# Patient Record
Sex: MALE | Race: WHITE | NOT HISPANIC OR LATINO | URBAN - METROPOLITAN AREA
[De-identification: names, ages, dates, MRNs, and addresses within clinical notes are randomized per-mention and may not be internally consistent; named-entity substitution may affect disease eponyms.]

---

## 2023-04-27 LAB
ALANINE AMINOTRANSFERASE (SGPT) (U/L) IN SER/PLAS: 18 U/L (ref 10–52)
ALBUMIN (G/DL) IN SER/PLAS: 4.8 G/DL (ref 3.4–5)
ALKALINE PHOSPHATASE (U/L) IN SER/PLAS: 67 U/L (ref 33–120)
ANION GAP IN SER/PLAS: 13 MMOL/L (ref 10–20)
ASPARTATE AMINOTRANSFERASE (SGOT) (U/L) IN SER/PLAS: 21 U/L (ref 9–39)
BASOPHILS (10*3/UL) IN BLOOD BY AUTOMATED COUNT: 0.04 X10E9/L (ref 0–0.1)
BASOPHILS/100 LEUKOCYTES IN BLOOD BY AUTOMATED COUNT: 0.8 % (ref 0–2)
BILIRUBIN TOTAL (MG/DL) IN SER/PLAS: 0.9 MG/DL (ref 0–1.2)
CALCIDIOL (25 OH VITAMIN D3) (NG/ML) IN SER/PLAS: 15 NG/ML
CALCIUM (MG/DL) IN SER/PLAS: 9.8 MG/DL (ref 8.6–10.3)
CARBON DIOXIDE, TOTAL (MMOL/L) IN SER/PLAS: 29 MMOL/L (ref 21–32)
CHLORIDE (MMOL/L) IN SER/PLAS: 102 MMOL/L (ref 98–107)
CHOLESTEROL (MG/DL) IN SER/PLAS: 233 MG/DL (ref 0–199)
CHOLESTEROL IN HDL (MG/DL) IN SER/PLAS: 38 MG/DL
CHOLESTEROL/HDL RATIO: 6.1
COBALAMIN (VITAMIN B12) (PG/ML) IN SER/PLAS: 248 PG/ML (ref 211–911)
CREATININE (MG/DL) IN SER/PLAS: 0.99 MG/DL (ref 0.5–1.3)
EOSINOPHILS (10*3/UL) IN BLOOD BY AUTOMATED COUNT: 0.11 X10E9/L (ref 0–0.7)
EOSINOPHILS/100 LEUKOCYTES IN BLOOD BY AUTOMATED COUNT: 2.3 % (ref 0–6)
ERYTHROCYTE DISTRIBUTION WIDTH (RATIO) BY AUTOMATED COUNT: 12.3 % (ref 11.5–14.5)
ERYTHROCYTE MEAN CORPUSCULAR HEMOGLOBIN CONCENTRATION (G/DL) BY AUTOMATED: 33.6 G/DL (ref 32–36)
ERYTHROCYTE MEAN CORPUSCULAR VOLUME (FL) BY AUTOMATED COUNT: 89 FL (ref 80–100)
ERYTHROCYTES (10*6/UL) IN BLOOD BY AUTOMATED COUNT: 5.09 X10E12/L (ref 4.5–5.9)
FOLATE (NG/ML) IN SER/PLAS: 15.9 NG/ML
GFR MALE: >90 ML/MIN/1.73M2
GLUCOSE (MG/DL) IN SER/PLAS: 101 MG/DL (ref 74–99)
HEMATOCRIT (%) IN BLOOD BY AUTOMATED COUNT: 45.2 % (ref 41–52)
HEMOGLOBIN (G/DL) IN BLOOD: 15.2 G/DL (ref 13.5–17.5)
IMMATURE GRANULOCYTES/100 LEUKOCYTES IN BLOOD BY AUTOMATED COUNT: 0.2 % (ref 0–0.9)
INSULIN, FASTING: 12 UIU/ML (ref 3–25)
LDL: 145 MG/DL (ref 0–99)
LEUKOCYTES (10*3/UL) IN BLOOD BY AUTOMATED COUNT: 4.8 X10E9/L (ref 4.4–11.3)
LYMPHOCYTES (10*3/UL) IN BLOOD BY AUTOMATED COUNT: 1.58 X10E9/L (ref 1.2–4.8)
LYMPHOCYTES/100 LEUKOCYTES IN BLOOD BY AUTOMATED COUNT: 32.7 % (ref 13–44)
MONOCYTES (10*3/UL) IN BLOOD BY AUTOMATED COUNT: 0.53 X10E9/L (ref 0.1–1)
MONOCYTES/100 LEUKOCYTES IN BLOOD BY AUTOMATED COUNT: 11 % (ref 2–10)
NEUTROPHILS (10*3/UL) IN BLOOD BY AUTOMATED COUNT: 2.56 X10E9/L (ref 1.2–7.7)
NEUTROPHILS/100 LEUKOCYTES IN BLOOD BY AUTOMATED COUNT: 53 % (ref 40–80)
NON HDL CHOLESTEROL: 195 MG/DL
PLATELETS (10*3/UL) IN BLOOD AUTOMATED COUNT: 178 X10E9/L (ref 150–450)
POTASSIUM (MMOL/L) IN SER/PLAS: 4.6 MMOL/L (ref 3.5–5.3)
PROTEIN TOTAL: 7.7 G/DL (ref 6.4–8.2)
SODIUM (MMOL/L) IN SER/PLAS: 139 MMOL/L (ref 136–145)
THYROTROPIN (MIU/L) IN SER/PLAS BY DETECTION LIMIT <= 0.05 MIU/L: 1.38 MIU/L (ref 0.44–3.98)
TRIGLYCERIDE (MG/DL) IN SER/PLAS: 250 MG/DL (ref 0–149)
UREA NITROGEN (MG/DL) IN SER/PLAS: 15 MG/DL (ref 6–23)
VLDL: 50 MG/DL (ref 0–40)

## 2023-05-04 LAB — VITAMIN B6: 566.8 NMOL/L (ref 20–125)

## 2023-10-16 PROBLEM — F41.0 PANIC DISORDER: Status: ACTIVE | Noted: 2023-10-16

## 2023-10-16 PROBLEM — T14.8XXA HEMATOMA: Status: ACTIVE | Noted: 2023-10-16

## 2023-10-16 PROBLEM — M54.59 OTHER LOW BACK PAIN: Status: ACTIVE | Noted: 2023-10-16

## 2023-10-16 PROBLEM — F33.1 MAJOR DEPRESSIVE DISORDER, RECURRENT EPISODE, MODERATE WITH ANXIOUS DISTRESS (MULTI): Status: ACTIVE | Noted: 2023-10-16

## 2023-10-16 PROBLEM — M54.2 CHRONIC NECK PAIN: Status: ACTIVE | Noted: 2023-10-16

## 2023-10-16 PROBLEM — F34.1 DYSTHYMIC DISORDER: Status: ACTIVE | Noted: 2023-10-16

## 2023-10-16 PROBLEM — F41.9 ANXIETY: Status: ACTIVE | Noted: 2023-10-16

## 2023-10-16 PROBLEM — E88.819 INSULIN RESISTANCE: Status: ACTIVE | Noted: 2023-10-16

## 2023-10-16 PROBLEM — E55.9 VITAMIN D DEFICIENCY: Status: ACTIVE | Noted: 2023-10-16

## 2023-10-16 PROBLEM — G89.29 CHRONIC NECK PAIN: Status: ACTIVE | Noted: 2023-10-16

## 2023-10-16 PROBLEM — R53.83 FATIGUE: Status: ACTIVE | Noted: 2023-10-16

## 2023-10-16 PROBLEM — R63.5 WEIGHT GAIN: Status: ACTIVE | Noted: 2023-10-16

## 2023-10-16 PROBLEM — L50.9 URTICARIA: Status: ACTIVE | Noted: 2023-10-16

## 2023-10-16 PROBLEM — E78.5 HYPERLIPEMIA: Status: ACTIVE | Noted: 2023-10-16

## 2023-10-16 PROBLEM — L05.91 PILONIDAL CYST: Status: ACTIVE | Noted: 2023-10-16

## 2023-10-16 RX ORDER — LANOLIN ALCOHOL/MO/W.PET/CERES
CREAM (GRAM) TOPICAL
COMMUNITY

## 2023-10-16 RX ORDER — VENLAFAXINE HYDROCHLORIDE 75 MG/1
3 CAPSULE, EXTENDED RELEASE ORAL DAILY
COMMUNITY
Start: 2016-08-02 | End: 2024-03-13 | Stop reason: WASHOUT

## 2023-10-16 RX ORDER — AMMONIUM LACTATE 12 G/100G
LOTION TOPICAL 2 TIMES DAILY
COMMUNITY

## 2023-10-16 RX ORDER — ERGOCALCIFEROL 1.25 MG/1
1.25 CAPSULE ORAL
COMMUNITY

## 2023-10-16 RX ORDER — CLONAZEPAM 0.5 MG/1
1 TABLET ORAL DAILY PRN
COMMUNITY
Start: 2018-08-07

## 2023-10-16 RX ORDER — VENLAFAXINE HYDROCHLORIDE 150 MG/1
150 CAPSULE, EXTENDED RELEASE ORAL DAILY
COMMUNITY
End: 2023-12-19 | Stop reason: SDUPTHER

## 2023-10-16 RX ORDER — VENLAFAXINE 37.5 MG/1
37.5 TABLET ORAL DAILY
COMMUNITY
End: 2023-12-19 | Stop reason: SDUPTHER

## 2023-10-16 RX ORDER — HYDROXYZINE HYDROCHLORIDE 25 MG/1
25 TABLET, FILM COATED ORAL 3 TIMES DAILY PRN
COMMUNITY
End: 2024-03-13 | Stop reason: SDUPTHER

## 2023-10-18 ENCOUNTER — APPOINTMENT (OUTPATIENT)
Dept: BEHAVIORAL HEALTH | Facility: CLINIC | Age: 38
End: 2023-10-18
Payer: MEDICAID

## 2023-11-02 ENCOUNTER — APPOINTMENT (OUTPATIENT)
Dept: BEHAVIORAL HEALTH | Facility: CLINIC | Age: 38
End: 2023-11-02
Payer: MEDICAID

## 2023-11-08 ENCOUNTER — TELEMEDICINE (OUTPATIENT)
Dept: BEHAVIORAL HEALTH | Facility: CLINIC | Age: 38
End: 2023-11-08
Payer: MEDICAID

## 2023-11-08 DIAGNOSIS — F41.9 ANXIETY: ICD-10-CM

## 2023-11-08 PROCEDURE — 90837 PSYTX W PT 60 MINUTES: CPT | Performed by: PSYCHOLOGIST

## 2023-11-08 NOTE — PROGRESS NOTES
Insight Oriented Therapy. 53 min.  Anxiety.  We spoke about how he is struggling for higher purpose and meaning in his job, wanting more from work, but not sure this will happen where he is.  He's ok making more money if that can happen . We also spoke about the blessings of his family.     Chief complaint - Anxiety   Treatment plan - Insight and action consistent with ACT therapy.   Goals - Self-care, insight, coping skills  Prognosis - Average  Progress - Average  Functional status - 70/100  Focused mental status: Patient was oriented to person, place, time and context  Focused mental exam - alert and oriented  Frequency - Every two weeks

## 2023-12-06 ENCOUNTER — TELEMEDICINE (OUTPATIENT)
Dept: BEHAVIORAL HEALTH | Facility: CLINIC | Age: 38
End: 2023-12-06
Payer: MEDICAID

## 2023-12-06 DIAGNOSIS — F41.9 ANXIETY: ICD-10-CM

## 2023-12-06 PROCEDURE — 90837 PSYTX W PT 60 MINUTES: CPT | Performed by: PSYCHOLOGIST

## 2023-12-06 NOTE — PROGRESS NOTES
Insight Oriented Therapy. 53 min.  Anxiety.  Good talk about ways he can breainstorm ideas, work from a plan of proactivity, carve out time for himself.     Chief complaint - Anxiety   Treatment plan - Insight and action consistent with ACT therapy.   Goals - Self-care, insight, coping skills  Prognosis - Average  Progress - Average  Functional status - 70/100  Focused mental status: Patient was oriented to person, place, time and context  Focused mental exam - alert and oriented  Frequency - Every two weeks

## 2023-12-19 DIAGNOSIS — F41.9 ANXIETY: Primary | ICD-10-CM

## 2023-12-19 RX ORDER — VENLAFAXINE HYDROCHLORIDE 150 MG/1
150 CAPSULE, EXTENDED RELEASE ORAL DAILY
Qty: 30 CAPSULE | Refills: 0 | Status: SHIPPED | OUTPATIENT
Start: 2023-12-19 | End: 2024-02-06 | Stop reason: SDUPTHER

## 2023-12-19 RX ORDER — VENLAFAXINE 37.5 MG/1
37.5 TABLET ORAL DAILY
Qty: 30 TABLET | Refills: 0 | Status: SHIPPED | OUTPATIENT
Start: 2023-12-19 | End: 2024-02-06 | Stop reason: SDUPTHER

## 2024-01-02 ENCOUNTER — TELEMEDICINE (OUTPATIENT)
Dept: BEHAVIORAL HEALTH | Facility: CLINIC | Age: 39
End: 2024-01-02
Payer: MEDICAID

## 2024-01-02 DIAGNOSIS — F41.1 GAD (GENERALIZED ANXIETY DISORDER): ICD-10-CM

## 2024-01-02 PROCEDURE — 90837 PSYTX W PT 60 MINUTES: CPT | Performed by: PSYCHOLOGIST

## 2024-01-02 NOTE — PROGRESS NOTES
Insight Oriented Therapy. 53 min.  Anxiety.  We spoke about what he's learned about self in 2023, how he wants to apply this insight into change in 2024.     Chief complaint - Anxiety   Treatment plan - Insight and action consistent with ACT therapy.   Goals - Self-care, insight, coping skills  Prognosis - Average  Progress - Average  Functional status - 70/100  Focused mental status: Patient was oriented to person, place, time and context  Focused mental exam - alert and oriented  Frequency - Every two weeks

## 2024-02-06 DIAGNOSIS — F41.9 ANXIETY: Primary | ICD-10-CM

## 2024-02-06 RX ORDER — VENLAFAXINE 37.5 MG/1
37.5 TABLET ORAL DAILY
Qty: 30 TABLET | Refills: 0 | Status: SHIPPED | OUTPATIENT
Start: 2024-02-06 | End: 2024-03-13 | Stop reason: SDUPTHER

## 2024-02-06 RX ORDER — VENLAFAXINE HYDROCHLORIDE 150 MG/1
150 CAPSULE, EXTENDED RELEASE ORAL DAILY
Qty: 30 CAPSULE | Refills: 0 | Status: SHIPPED | OUTPATIENT
Start: 2024-02-06 | End: 2024-03-13 | Stop reason: SDUPTHER

## 2024-03-13 ENCOUNTER — TELEPHONE (OUTPATIENT)
Dept: BEHAVIORAL HEALTH | Facility: CLINIC | Age: 39
End: 2024-03-13
Payer: MEDICAID

## 2024-03-13 DIAGNOSIS — F41.9 ANXIETY: ICD-10-CM

## 2024-03-13 RX ORDER — HYDROXYZINE HYDROCHLORIDE 25 MG/1
25 TABLET, FILM COATED ORAL 3 TIMES DAILY PRN
Qty: 90 TABLET | Refills: 0 | Status: SHIPPED | OUTPATIENT
Start: 2024-03-13

## 2024-03-13 RX ORDER — VENLAFAXINE HYDROCHLORIDE 150 MG/1
150 CAPSULE, EXTENDED RELEASE ORAL DAILY
Qty: 90 CAPSULE | Refills: 0 | Status: SHIPPED | OUTPATIENT
Start: 2024-03-13 | End: 2024-04-30 | Stop reason: SDUPTHER

## 2024-03-13 RX ORDER — VENLAFAXINE 37.5 MG/1
37.5 TABLET ORAL DAILY
Qty: 90 TABLET | Refills: 0 | Status: SHIPPED | OUTPATIENT
Start: 2024-03-13 | End: 2024-04-30 | Stop reason: SDUPTHER

## 2024-03-14 ENCOUNTER — TELEPHONE (OUTPATIENT)
Dept: BEHAVIORAL HEALTH | Facility: CLINIC | Age: 39
End: 2024-03-14
Payer: MEDICAID

## 2024-03-22 ENCOUNTER — TELEMEDICINE (OUTPATIENT)
Dept: BEHAVIORAL HEALTH | Facility: CLINIC | Age: 39
End: 2024-03-22
Payer: MEDICAID

## 2024-03-22 DIAGNOSIS — F41.9 ANXIETY: ICD-10-CM

## 2024-03-22 PROCEDURE — 90837 PSYTX W PT 60 MINUTES: CPT | Performed by: PSYCHOLOGIST

## 2024-03-22 NOTE — PROGRESS NOTES
Insight Oriented Therapy. 50 min. Anxiety.  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     Chief complaint - Phase of life   Treatment plan - Insight and action consistent with ACT therapy.   Goals - Self-care, insight, coping skills  Prognosis - Average  Progress - Average  Functional status - 70/100  Focused mental status: Patient was oriented to person, place, time and context  Focused mental exam - alert and oriented  Frequency - Every two weeks

## 2024-04-19 ENCOUNTER — TELEMEDICINE (OUTPATIENT)
Dept: BEHAVIORAL HEALTH | Facility: CLINIC | Age: 39
End: 2024-04-19
Payer: MEDICAID

## 2024-04-19 DIAGNOSIS — F41.9 ANXIETY: ICD-10-CM

## 2024-04-19 PROCEDURE — 90837 PSYTX W PT 60 MINUTES: CPT | Performed by: PSYCHOLOGIST

## 2024-04-19 NOTE — PROGRESS NOTES
Insight Oriented Therapy. 50 min.  Anxiety.  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     Chief complaint - Phase of life   Treatment plan - Insight and action consistent with ACT therapy.   Goals - Self-care, insight, coping skills  Prognosis - Average  Progress - Average  Functional status - 70/100  Focused mental status: Patient was oriented to person, place, time and context  Focused mental exam - alert and oriented  Frequency - Every two weeks

## 2024-04-30 ENCOUNTER — TELEMEDICINE (OUTPATIENT)
Dept: BEHAVIORAL HEALTH | Facility: CLINIC | Age: 39
End: 2024-04-30
Payer: MEDICAID

## 2024-04-30 DIAGNOSIS — F41.9 ANXIETY: ICD-10-CM

## 2024-04-30 DIAGNOSIS — F34.1 DYSTHYMIC DISORDER: ICD-10-CM

## 2024-04-30 DIAGNOSIS — F41.0 PANIC DISORDER: ICD-10-CM

## 2024-04-30 PROCEDURE — 1036F TOBACCO NON-USER: CPT

## 2024-04-30 PROCEDURE — 99214 OFFICE O/P EST MOD 30 MIN: CPT

## 2024-04-30 RX ORDER — HYDROXYZINE PAMOATE 25 MG/1
25 CAPSULE ORAL NIGHTLY PRN
Qty: 30 CAPSULE | Refills: 2 | Status: SHIPPED | OUTPATIENT
Start: 2024-04-30

## 2024-04-30 RX ORDER — VENLAFAXINE HYDROCHLORIDE 150 MG/1
150 CAPSULE, EXTENDED RELEASE ORAL DAILY
Qty: 90 CAPSULE | Refills: 0 | Status: SHIPPED | OUTPATIENT
Start: 2024-04-30

## 2024-04-30 RX ORDER — VENLAFAXINE 37.5 MG/1
37.5 TABLET ORAL DAILY
Qty: 90 TABLET | Refills: 0 | Status: SHIPPED | OUTPATIENT
Start: 2024-04-30

## 2024-04-30 NOTE — PATIENT INSTRUCTIONS
Plan/Recommendations:  Medications:    -Continue venlafaxine ER one 150 mg cap and one 37.5 mg cap daily for anxiety and depression   -Start Vistaril 25 mg one at bedtime as needed for anxiety and sleep  Continue therapy sessions as needed  Follow up: July 31st 1130  Call  Psychiatry at [phone] with issues.  For [facility] residents, [employer] is a 24/7 hotline you can call for assistance [[phone]]. Please call [phone] or go to your closest Emergency Room if you feel unsafe. This includes thoughts of hurting yourself or anyone else, or having other troubles such as hearing voices, seeing visions, or having new and scary thoughts about the people around you.

## 2024-04-30 NOTE — PROGRESS NOTES
Outpatient Psychiatry- Follow up visit    Subjective   Jordin Middleton, a 39 y.o. male, presenting for follow up visit for   Chief Complaint   Patient presents with    Anxiety    Depression    Panic Attack        HPI:  NOTE: Symptom scale is rated where 0 = no symptoms at all, and 10 = symptoms so severe that pt is an imminent danger to themselves or others and requires hospitalization.    Anxiety and Depression remains present more days than not, which has remained unchanged over the past few weeks. Jordin Middleton rates the severity of psych symptoms as a 3-5/10, noting symptom improvement with exercise and mindfulness and worsening of symptoms with when he is alone with his thoughts and thinking about the future.    Jordin states things have been good; he is exercising, not meeting his fitness goals but he likes how he feels after. Helps him stay level mentally. Doing weights and treadmill. Has a lot of chronic pain still, doing meditation and mindfulness before bed, also helps with anxiety.     Had some negative thought patterns a few month ago, holding onto old feelings of pain and resentment from people in his professional career, was affecting him before bed. Reoccurring. Realized that it doesn't matter, talked through it with Dr Brink. Realized there is no room for that in his life, stopped putting energy into it. Chooses his mental health over it.     Mood will trend downward with increased anxiety, but does a better job managing it. Has a lot of tools to manage it now. Denies SI/HI and passive thoughts of death.     Sleep has room for improvement; kids still get up periodically. Has made a point to get to bed sooner, in bed for 8 hr but the quality of sleep is not always the best. Supplements more with caffeine, can have up to 5 cups of coffee a day. Likes to think it helps with his productivity. Higher the last 2-3 months.     Psychiatric Review Of Systems:  Depressive Symptoms:  low mood at times  Manic  Symptoms: negative  Anxiety Symptoms: General Anxiety Disorder (ELYSSA)ELYSSA Behaviors: difficult to control worry and sleep disturbance  Psychotic Symptoms: negative  Trauma Symptoms: Avoiding triggers  Other Symptoms/Concerns:Other: (comments) none reported  Delirium/Altered Mental Status Symptoms:Other: (comment) WNL    Current Medications:    Current Outpatient Medications:     ammonium lactate (Lac-Hydrin) 12 % lotion, Apply topically 2 times a day. As directed, Disp: , Rfl:     cetirizine (ZyrTEC) 10 mg capsule, Take 1 capsule (10 mg) by mouth once daily., Disp: , Rfl:     clonazePAM (KlonoPIN) 0.5 mg tablet, Take 1 tablet (0.5 mg) by mouth once daily as needed for anxiety., Disp: , Rfl:     cyanocobalamin (Vitamin B-12) 1,000 mcg tablet, , Disp: , Rfl:     ergocalciferol (Vitamin D-2) 1.25 MG (39388 UT) capsule, Take 1 capsule (1,250 mcg) by mouth 1 (one) time per week. X 8 weeks. After completion use OTC vitamin D3 2000iu daily therafter, Disp: , Rfl:     FIBER, PSYLLIUM HUSK, ORAL, Take by mouth., Disp: , Rfl:     hydrOXYzine HCL (Atarax) 25 mg tablet, Take 1 tablet (25 mg) by mouth 3 times a day as needed for anxiety., Disp: 90 tablet, Rfl: 0    hydrOXYzine pamoate (VistariL) 25 mg capsule, Take 1 capsule (25 mg) by mouth as needed at bedtime for anxiety., Disp: 30 capsule, Rfl: 2    venlafaxine (Effexor) 37.5 mg tablet, Take 1 tablet (37.5 mg) by mouth once daily., Disp: 90 tablet, Rfl: 0    venlafaxine XR (Effexor-XR) 150 mg 24 hr capsule, Take 1 capsule (150 mg) by mouth once daily. Do not crush or chew., Disp: 90 capsule, Rfl: 0    Medical History:  History reviewed. No pertinent past medical history.    Past Psychiatric History:   Onset History: Anxiety and depression 10 yo.   Inpatient history: None   Suicide attempts/Self-Harm/Ideation History: None   Current providers: Dr Brink.   Past providers: Dr Chavez, Dr Parham.   Past medication trials: fluoxetine, Cymbalta, BuSpar, venlafaxine ER, clonazepam.      Family psychiatric history:  Mother    · Family history of depression  Maternal Grandfather    · Family history of alcoholism    · Family history of depression    Social History:   Upbringing: Born and raised in [place], [place]. Parents , father 21 years older than his mother. When he was 12 yo all his grandparents passed away within a few years, impacted him greatly, felt he was not safe, created fear that his something would happen to his parents. Had a great, loving family growing up. One younger sister.   Trauma: Bullied as a pre-teen for being overweight, developed negative thoughts and severe social anxiety. A lot of it went away as he became and adult and matured. He would be terrified that people were always talking about him and making fun of him. Denies hx of physical and sexual abuse.  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    MEDICAL HISTORY  -PCP: Jordin Hale MD    -TBI/head trauma/LOC/seizure hx: denies      Objective   Mental Status Exam  Appearance: Jordin has short dark hair with a full beard. He is neat and clean in appearance, dressed appropriately.   Attitude: Calm, cooperative, and engaged in conversation.  Behavior: Appropriate eye contact.   Motor Activity: No psychomotor agitation or retardation. No abnormal movements, tremors or tics. No evidence of  "extrapyramidal symptoms or tardive dyskinesia.  Speech: Regular rate, rhythm, volume. Spontaneous, no pressured speech.  Mood: \"trends downward\"  Affect: Full range, appropriate, mood congruent.  Thought Process: Linear, logical, and goal-directed. No loose associations or gross thought disorganization.  Thought Content: Denied current suicidal ideation or thoughts of harm to self, denied homicidal ideation or thoughts of harm to others. No delusional thinking elicited. No perseverations or obsessions identified.   Perception: Did not endorse auditory or visual hallucinations, did not appear to be responding to hallucinatory stimuli.   Cognition: Alert, oriented x3. Preserved attention span and concentration, recent and remote memory. Adequate fund of knowledge. No deficits in language.   Insight: Good, in regards to understanding mental health condition  Judgement: Intact      Vitals:  There were no vitals filed for this visit.    Risk Assessment:  SI/HI ASSESSMENT  -Risk Assessment: Jordin Middleton is currently a low acute risk of suicide and self-harm due to no past suicide attempt(s) and not currently endorsing thoughts of suicide. Jordin Middleton is currently a low acute risk of violence and harm to others due to no past history of violence and not currently threatening others.  -Suicidal Risk Factors:  and male  -Violence Risk Factors: male  -Protective Factors: strong coping skills, sense of responsibility towards family, social support/connectedness, child related concerns/living with child <18 years, positive family relationships, hopefulness/future orientation, marriage/partnership, and employment  -Plan to Reduce Risk: Establish medication regimen and outpatient follow-up care    Jordin was seen today for anxiety, depression and panic attack.  Diagnoses and all orders for this visit:  Anxiety  -     venlafaxine (Effexor) 37.5 mg tablet; Take 1 tablet (37.5 mg) by mouth once daily.  -     venlafaxine XR " (Effexor-XR) 150 mg 24 hr capsule; Take 1 capsule (150 mg) by mouth once daily. Do not crush or chew.  -     hydrOXYzine pamoate (VistariL) 25 mg capsule; Take 1 capsule (25 mg) by mouth as needed at bedtime for anxiety.  Dysthymic disorder  Panic disorder       Impression:  Jordin was last seen by this provider 8/23.  Overall he has been doing well, he remains in therapy and continues to use his coping skills as needed.  Mood is stable; will have more depressive symptoms when anxiety is elevated.  Discussed treatment plan, Jordin feels that venlafaxine is at a good dose.  Discussed the option of switching from Atarax to Vistaril at bedtime which may be more beneficial for anxiety and sleep.  Jordin agreeable, will order Vistaril 25 mg 1 at bedtime as needed for anxiety and sleep.  Plan to follow-up in 3 months.      Plan/Recommendations:  Medications:    -Continue venlafaxine ER one 150 mg cap and one 37.5 mg cap daily for anxiety and depression   -Start Vistaril 25 mg one at bedtime as needed for anxiety and sleep  Continue therapy sessions as needed  Follow up: July 31st 1130  Call  Psychiatry at [phone] with issues.  For [facility] residents, [employer] is a 24/7 hotline you can call for assistance [609.775.9367]. Please call [phone] or go to your closest Emergency Room if you feel unsafe. This includes thoughts of hurting yourself or anyone else, or having other troubles such as hearing voices, seeing visions, or having new and scary thoughts about the people around you.    Review with patient: Treatment plan reviewed with the patient.  Medication risks/benefit reviewed with the patient    Time Spent:  Prep time: 1 min  Direct patient time: 30 min  Documentation time: 8 min  Total time: 39 min    ROBERT Salinas-CNP

## 2024-05-17 ENCOUNTER — TELEMEDICINE (OUTPATIENT)
Dept: BEHAVIORAL HEALTH | Facility: CLINIC | Age: 39
End: 2024-05-17
Payer: MEDICAID

## 2024-05-17 DIAGNOSIS — F41.9 ANXIETY: ICD-10-CM

## 2024-05-17 PROCEDURE — 90837 PSYTX W PT 60 MINUTES: CPT | Performed by: PSYCHOLOGIST

## 2024-05-17 NOTE — PROGRESS NOTES
Insight Oriented Therapy. 50 min.  Anxiety.  We spoke about how he is trying to work on delegation, self-care, ways to keep on top of things with a mindful eye on balance.    Chief complaint - Phase of life   Treatment plan - Insight and action consistent with ACT therapy. Providing support, safe space to process their thoughts and feelings, developing therapeutic relationship.  Goals - Self-care, insight, coping skills  Prognosis - Average  Progress - Average  Functional status - 70/100  Focused mental status: Patient was oriented to person, place, time and context  Focused mental exam - alert and oriented  Frequency - Every two weeks

## 2024-07-02 ENCOUNTER — TELEPHONE (OUTPATIENT)
Dept: BEHAVIORAL HEALTH | Facility: CLINIC | Age: 39
End: 2024-07-02
Payer: MEDICAID

## 2024-07-02 DIAGNOSIS — F41.9 ANXIETY: ICD-10-CM

## 2024-07-02 RX ORDER — VENLAFAXINE 37.5 MG/1
37.5 TABLET ORAL DAILY
Qty: 90 TABLET | Refills: 0 | Status: SHIPPED | OUTPATIENT
Start: 2024-07-02

## 2024-07-02 RX ORDER — VENLAFAXINE HYDROCHLORIDE 150 MG/1
150 CAPSULE, EXTENDED RELEASE ORAL DAILY
Qty: 90 CAPSULE | Refills: 0 | Status: SHIPPED | OUTPATIENT
Start: 2024-07-02

## 2024-07-31 ENCOUNTER — APPOINTMENT (OUTPATIENT)
Dept: BEHAVIORAL HEALTH | Facility: CLINIC | Age: 39
End: 2024-07-31
Payer: MEDICAID

## 2024-07-31 DIAGNOSIS — F41.9 ANXIETY: ICD-10-CM

## 2024-07-31 DIAGNOSIS — F33.1 MAJOR DEPRESSIVE DISORDER, RECURRENT EPISODE, MODERATE WITH ANXIOUS DISTRESS (MULTI): ICD-10-CM

## 2024-07-31 DIAGNOSIS — F34.1 DYSTHYMIC DISORDER: ICD-10-CM

## 2024-07-31 PROCEDURE — 99213 OFFICE O/P EST LOW 20 MIN: CPT

## 2024-07-31 RX ORDER — VENLAFAXINE HYDROCHLORIDE 150 MG/1
150 CAPSULE, EXTENDED RELEASE ORAL DAILY
Qty: 90 CAPSULE | Refills: 0 | Status: SHIPPED | OUTPATIENT
Start: 2024-07-31

## 2024-07-31 RX ORDER — VENLAFAXINE 37.5 MG/1
37.5 TABLET ORAL DAILY
Qty: 90 TABLET | Refills: 0 | Status: SHIPPED | OUTPATIENT
Start: 2024-07-31

## 2024-07-31 NOTE — PROGRESS NOTES
Outpatient Psychiatry- Follow up visit    Subjective   Jordin Middleton, a 39 y.o. male, presenting for follow up visit for   Chief Complaint   Patient presents with    MDD (Major Depressive Disorder)    Anxiety    Panic Attack        HPI:  Jordin states he is doing pretty good, very busy with work and kids. Mood has been OK, good.  Had some intermittent irritability but manageable. Overall feels his mood is stable. Able to be productive at work and at home. Being mindful fo his health, exercising and minding his diet.     Trying to look at things less emotionally and taking things with a grain of salt.  His  noticed he is more patient. Denies SI/HI and passive thoughts of death.     Anxiety has been manageable, still there but not reacting to it. Will have moments but not as drastic. Has not had any panic attacks since last visit.     NOTE: Symptom scale is rated where 0 = no symptoms at all, and 10 = symptoms so severe that pt is an imminent danger to themselves or others and requires hospitalization.    Anxiety remains present more days than not, which has improved over the past few weeks. Rates the severity of psych symptoms as a 3/10, noting symptom improvement with coping kills and lifestyle changes and worsening of symptoms with idle time and having more time to dwell on them.    Was switched from Atarax to Vistaril, did not notice much difference.  Sleep is improved overall, no longer having or holding onto old thoughts of resentment.  Switched around his bedroom and he is sleeping better. Enjoys going to bed now.     Seeing Dr Brink every 2-4 weeks. Has been very helpful and is putting things into practice that has been very helpful.     Per previous HPI:  NOTE: Symptom scale is rated where 0 = no symptoms at all, and 10 = symptoms so severe that pt is an imminent danger to themselves or others and requires hospitalization.    Anxiety and Depression remains present more days than not, which has  remained unchanged over the past few weeks. Jordin De La Cruzdiana rates the severity of psych symptoms as a 3-5/10, noting symptom improvement with exercise and mindfulness and worsening of symptoms with when he is alone with his thoughts and thinking about the future.    Jordin states things have been good; he is exercising, not meeting his fitness goals but he likes how he feels after. Helps him stay level mentally. Doing weights and treadmill. Has a lot of chronic pain still, doing meditation and mindfulness before bed, also helps with anxiety.     Had some negative thought patterns a few month ago, holding onto old feelings of pain and resentment from people in his professional career, was affecting him before bed. Reoccurring. Realized that it doesn't matter, talked through it with Dr Brink. Realized there is no room for that in his life, stopped putting energy into it. Chooses his mental health over it.     Mood will trend downward with increased anxiety, but does a better job managing it. Has a lot of tools to manage it now. Denies SI/HI and passive thoughts of death.     Sleep has room for improvement; kids still get up periodically. Has made a point to get to bed sooner, in bed for 8 hr but the quality of sleep is not always the best. Supplements more with caffeine, can have up to 5 cups of coffee a day. Likes to think it helps with his productivity. Higher the last 2-3 months.     Psychiatric Review Of Systems:  Depressive Symptoms: negative  Manic Symptoms: negative  Anxiety Symptoms: General Anxiety Disorder (ELYSSA)ELYSSA Behaviors: manageable  Psychotic Symptoms: negative  Trauma Symptoms: Avoiding triggers  Other Symptoms/Concerns:Other: (comments) none reported  Delirium/Altered Mental Status Symptoms:Other: (comment) WNL    Current Medications:    Current Outpatient Medications:     ammonium lactate (Lac-Hydrin) 12 % lotion, Apply topically 2 times a day. As directed, Disp: , Rfl:     cetirizine (ZyrTEC) 10 mg  capsule, Take 1 capsule (10 mg) by mouth once daily., Disp: , Rfl:     clonazePAM (KlonoPIN) 0.5 mg tablet, Take 1 tablet (0.5 mg) by mouth once daily as needed for anxiety., Disp: , Rfl:     cyanocobalamin (Vitamin B-12) 1,000 mcg tablet, , Disp: , Rfl:     ergocalciferol (Vitamin D-2) 1.25 MG (11002 UT) capsule, Take 1 capsule (1,250 mcg) by mouth 1 (one) time per week. X 8 weeks. After completion use OTC vitamin D3 2000iu daily therafter, Disp: , Rfl:     FIBER, PSYLLIUM HUSK, ORAL, Take by mouth., Disp: , Rfl:     hydrOXYzine HCL (Atarax) 25 mg tablet, Take 1 tablet (25 mg) by mouth 3 times a day as needed for anxiety., Disp: 90 tablet, Rfl: 0    hydrOXYzine pamoate (VistariL) 25 mg capsule, Take 1 capsule (25 mg) by mouth as needed at bedtime for anxiety., Disp: 30 capsule, Rfl: 2    venlafaxine (Effexor) 37.5 mg tablet, Take 1 tablet (37.5 mg) by mouth once daily., Disp: 90 tablet, Rfl: 0    venlafaxine XR (Effexor-XR) 150 mg 24 hr capsule, Take 1 capsule (150 mg) by mouth once daily. Do not crush or chew., Disp: 90 capsule, Rfl: 0    Medical History:  History reviewed. No pertinent past medical history.    Past Psychiatric History:   Onset History: Anxiety and depression 12 yo.   Inpatient history: None   Suicide attempts/Self-Harm/Ideation History: None   Current providers: Dr Brink.   Past providers: Dr Chavez, Dr Parham.   Past medication trials: fluoxetine, Cymbalta, BuSpar, venlafaxine ER, clonazepam.     Family psychiatric history:  Mother    · Family history of depression  Maternal Grandfather    · Family history of alcoholism    · Family history of depression    Social History:   Upbringing: Born and raised in [place], [place]. Parents , father 21 years older than his mother. When he was 12 yo all his grandparents passed away within a few years, impacted him greatly, felt he was not safe, created fear that his something would happen to his parents. Had a great, loving family growing up. One younger  "sister.   Trauma: Bullied as a pre-teen for being overweight, developed negative thoughts and severe social anxiety. A lot of it went away as he became and adult and matured. He would be terrified that people were always talking about him and making fun of him. Denies hx of physical and sexual abuse.  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    MEDICAL HISTORY  -PCP: Jordin Hale MD    -TBI/head trauma/LOC/seizure hx: denies      Objective   Mental Status Exam  Appearance: Jordin has short dark hair with a full beard. He is neat and clean in appearance, dressed appropriately.   Attitude: Calm, cooperative, and engaged in conversation.  Behavior: Appropriate eye contact.   Motor Activity: No psychomotor agitation or retardation. No abnormal movements, tremors or tics. No evidence of extrapyramidal symptoms or tardive dyskinesia.  Speech: Regular rate, rhythm, volume. Spontaneous, no pressured speech.  Mood: \"good\"  Affect: Full range, appropriate, mood congruent.  Thought Process: Linear, logical, and goal-directed. No loose associations or gross thought disorganization.  Thought Content: Denied current suicidal ideation or thoughts of harm to self, denied homicidal ideation or thoughts of harm to others. No delusional thinking elicited. No perseverations or obsessions identified.   Perception: Did " not endorse auditory or visual hallucinations, did not appear to be responding to hallucinatory stimuli.   Cognition: Alert, oriented x3. Preserved attention span and concentration, recent and remote memory. Adequate fund of knowledge. No deficits in language.   Insight: Good, in regards to understanding mental health condition  Judgement: Intact      Vitals:  There were no vitals filed for this visit.    Risk Assessment:  SI/HI ASSESSMENT  -Risk Assessment: Jordin Middleton is currently a low acute risk of suicide and self-harm due to no past suicide attempt(s) and not currently endorsing thoughts of suicide. Jordin Middleton is currently a low acute risk of violence and harm to others due to no past history of violence and not currently threatening others.  -Suicidal Risk Factors:  and male  -Violence Risk Factors: male  -Protective Factors: strong coping skills, sense of responsibility towards family, social support/connectedness, child related concerns/living with child <18 years, positive family relationships, hopefulness/future orientation, marriage/partnership, and employment  -Plan to Reduce Risk: Establish medication regimen and outpatient follow-up care    Jordin was seen today for mdd (major depressive disorder), anxiety and panic attack.  Diagnoses and all orders for this visit:  Major depressive disorder, recurrent episode, moderate with anxious distress (Multi)  Anxiety  Dysthymic disorder       Impression:  Jordin is doing well, feels he is in a good spot mentally. Continues to work with Dr Brink regularly. He trialed Vistaril at last visit to see if it would help more with sleep, did not notice much of a difference between that and Atarax for sleep. Sleep overall is improved, Discussed treatment plan, including the option of having his PCP manage his medication.  Jordin needs to establish with a new provider, will follow up in three months and he will work on finding a new PCP.        Plan/Recommendations:  Medications:    -Continue venlafaxine ER one 150 mg cap and one 37.5 mg cap daily for anxiety and depression   -Continue Vistaril 25 mg one at bedtime as needed for anxiety and sleep  Continue therapy sessions as needed  Follow up: Nov 8th 1030  Call  Psychiatry at [phone] with issues.  For [facility] residents, [facility] is a 24/7 hotline you can call for assistance [[phone]]. Please call [phone] or go to your closest Emergency Room if you feel unsafe. This includes thoughts of hurting yourself or anyone else, or having other troubles such as hearing voices, seeing visions, or having new and scary thoughts about the people around you.    Review with patient: Treatment plan reviewed with the patient.  Medication risks/benefit reviewed with the patient    Time Spent:  Prep time: 1 min  Direct patient time: 18 min  Documentation time: 6 min  Total time: 26 min    ROBERT Salinas-CNP

## 2024-09-11 ENCOUNTER — APPOINTMENT (OUTPATIENT)
Dept: BEHAVIORAL HEALTH | Facility: CLINIC | Age: 39
End: 2024-09-11
Payer: MEDICAID

## 2024-09-11 DIAGNOSIS — F41.9 ANXIETY: ICD-10-CM

## 2024-09-11 PROCEDURE — 90837 PSYTX W PT 60 MINUTES: CPT | Performed by: PSYCHOLOGIST

## 2024-09-11 NOTE — PROGRESS NOTES
Insight Oriented Therapy. 50 min.  Anxiety.  We spoke about how he would like to figure out ways to make his days 3% more flow, consolidated, then build upon this going forward to find more ease and space for himself.     Chief complaint - Phase of life   Treatment plan - Insight and action consistent with ACT therapy. Providing support, safe space to process their thoughts and feelings, developing therapeutic relationship.  Goals - Self-care, insight, coping skills  Prognosis - Average  Progress - Average  Functional status - 70/100  Focused mental status: Patient was oriented to person, place, time and context  Focused mental exam - alert and oriented  Frequency - Every two weeks

## 2024-10-17 ENCOUNTER — APPOINTMENT (OUTPATIENT)
Dept: BEHAVIORAL HEALTH | Facility: CLINIC | Age: 39
End: 2024-10-17
Payer: MEDICAID

## 2024-10-17 DIAGNOSIS — F41.9 ANXIETY: ICD-10-CM

## 2024-10-17 PROCEDURE — 90837 PSYTX W PT 60 MINUTES: CPT | Performed by: PSYCHOLOGIST

## 2024-10-17 NOTE — PROGRESS NOTES
Insight Oriented Therapy. 50 min.   Anxiety.  We spoke about the true value of finding balance with work and family and how he doesn't want to neglect the latter, for the former.  We spoke about ways to cope with work stress and sleep.     Chief complaint - Phase of life and emotional distress   Treatment plan - Insight and action consistent with ACT therapy. Providing support, safe space to process their thoughts and feelings, developing therapeutic relationship.  Goals - Self-care, insight, coping skills  Prognosis - Average  Progress - Average  Functional status - 70/100  Focused mental status: Patient was oriented to person, place, time and context  Focused mental exam - alert and oriented  Frequency - Every two weeks

## 2024-10-29 ENCOUNTER — TELEPHONE (OUTPATIENT)
Dept: BEHAVIORAL HEALTH | Facility: CLINIC | Age: 39
End: 2024-10-29
Payer: MEDICAID

## 2024-10-29 DIAGNOSIS — F41.9 ANXIETY: ICD-10-CM

## 2024-10-29 RX ORDER — VENLAFAXINE 37.5 MG/1
37.5 TABLET ORAL DAILY
Qty: 90 TABLET | Refills: 0 | Status: SHIPPED | OUTPATIENT
Start: 2024-10-29

## 2024-10-29 RX ORDER — VENLAFAXINE HYDROCHLORIDE 150 MG/1
150 CAPSULE, EXTENDED RELEASE ORAL DAILY
Qty: 90 CAPSULE | Refills: 0 | Status: SHIPPED | OUTPATIENT
Start: 2024-10-29

## 2024-10-29 RX ORDER — HYDROXYZINE PAMOATE 25 MG/1
25 CAPSULE ORAL NIGHTLY PRN
Qty: 30 CAPSULE | Refills: 2 | Status: SHIPPED | OUTPATIENT
Start: 2024-10-29

## 2024-11-08 ENCOUNTER — APPOINTMENT (OUTPATIENT)
Dept: BEHAVIORAL HEALTH | Facility: CLINIC | Age: 39
End: 2024-11-08
Payer: MEDICAID

## 2024-11-08 DIAGNOSIS — F41.9 ANXIETY: ICD-10-CM

## 2024-11-08 DIAGNOSIS — F34.1 DYSTHYMIC DISORDER: ICD-10-CM

## 2024-11-08 DIAGNOSIS — F33.1 MAJOR DEPRESSIVE DISORDER, RECURRENT EPISODE, MODERATE WITH ANXIOUS DISTRESS (MULTI): ICD-10-CM

## 2024-11-08 DIAGNOSIS — F41.0 PANIC DISORDER: ICD-10-CM

## 2024-11-08 PROCEDURE — 1036F TOBACCO NON-USER: CPT

## 2024-11-08 PROCEDURE — 99213 OFFICE O/P EST LOW 20 MIN: CPT

## 2024-11-08 RX ORDER — VENLAFAXINE 37.5 MG/1
37.5 TABLET ORAL DAILY
Qty: 90 TABLET | Refills: 0 | Status: SHIPPED | OUTPATIENT
Start: 2024-11-08

## 2024-11-08 RX ORDER — HYDROXYZINE HYDROCHLORIDE 25 MG/1
25 TABLET, FILM COATED ORAL 3 TIMES DAILY PRN
Qty: 90 TABLET | Refills: 0 | Status: SHIPPED | OUTPATIENT
Start: 2024-11-08

## 2024-11-08 RX ORDER — VENLAFAXINE HYDROCHLORIDE 150 MG/1
150 CAPSULE, EXTENDED RELEASE ORAL DAILY
Qty: 90 CAPSULE | Refills: 0 | Status: SHIPPED | OUTPATIENT
Start: 2024-11-08

## 2024-11-08 NOTE — PATIENT INSTRUCTIONS
Plan/Recommendations:  Medications:    -Continue venlafaxine ER one 150 mg cap and one 37.5 mg cap daily for anxiety and depression   -Continue hydroxyzine 25 mg one at bedtime as needed for anxiety and sleep  Continue therapy sessions as needed  Follow up: Feb 19th 2:00  Call  Psychiatry at [phone] with issues.  For [facility] residents, [facility] is a 24/7 hotline you can call for assistance [[phone]]. Please call [phone]/103 or go to your closest Emergency Room if you feel unsafe. This includes thoughts of hurting yourself or anyone else, or having other troubles such as hearing voices, seeing visions, or having new and scary thoughts about the people around you.

## 2024-11-08 NOTE — PROGRESS NOTES
Outpatient Psychiatry- Follow up visit    Subjective   Jordin Middleton, a 39 y.o. male, presenting for follow up visit for   No chief complaint on file.     Virtual or Telephone Consent    An interactive audio and video telecommunication system which permits real time communications between the patient (at the originating site) and provider (at the distant site) was utilized to provide this telehealth service.   Verbal consent was requested and obtained from Jordin Middleton on this date, 11/08/24 for a telehealth visit.      HPI:  Jordin states things have been pretty good over the last few months. Has had some moments here and there with anxiety but is manageable.     KURT is battling lung cancer and that has been hard on them. Not sure how she is doing because she keeps it to herself. Has lost her will to fight which is hard on his wife.     Mood is good, denies lingering depressive sx. Has the same depressive and anxiety thoughts that come and go but not lasting long. Pretty confident where he is currently. Denies SI/HI and passive thoughts of death.     Sleep has been OK; starting taking magnesium and melatonin at night which has helped a lot with is sleep. Not always getting a lot of sleep due to the children but is getting better quality of sleep.     Plans to go to [place] in December with the kids to see his uncle.    Per previous HPI:  Jordin states he is doing pretty good, very busy with work and kids. Mood has been OK, good.  Had some intermittent irritability but manageable. Overall feels his mood is stable. Able to be productive at work and at home. Being mindful fo his health, exercising and minding his diet.     Trying to look at things less emotionally and taking things with a grain of salt.  His  noticed he is more patient. Denies SI/HI and passive thoughts of death.     Anxiety has been manageable, still there but not reacting to it. Will have moments but not as drastic. Has not had any panic  attacks since last visit.     NOTE: Symptom scale is rated where 0 = no symptoms at all, and 10 = symptoms so severe that pt is an imminent danger to themselves or others and requires hospitalization.    Anxiety remains present more days than not, which has improved over the past few weeks. Rates the severity of psych symptoms as a 3/10, noting symptom improvement with coping kills and lifestyle changes and worsening of symptoms with idle time and having more time to dwell on them.    Was switched from Atarax to Vistaril, did not notice much difference.  Sleep is improved overall, no longer having or holding onto old thoughts of resentment.  Switched around his bedroom and he is sleeping better. Enjoys going to bed now.     Seeing Dr Brink every 2-4 weeks. Has been very helpful and is putting things into practice that has been very helpful.       Psychiatric Review Of Systems:  Depressive Symptoms: negative  Manic Symptoms: negative  Anxiety Symptoms: General Anxiety Disorder (ELYSSA)ELYSSA Behaviors: manageable  Psychotic Symptoms: negative  Trauma Symptoms: Avoiding triggers  Other Symptoms/Concerns:Other: (comments) none reported  Delirium/Altered Mental Status Symptoms:Other: (comment) WNL    Current Medications:    Current Outpatient Medications:     ammonium lactate (Lac-Hydrin) 12 % lotion, Apply topically 2 times a day. As directed, Disp: , Rfl:     cetirizine (ZyrTEC) 10 mg capsule, Take 1 capsule (10 mg) by mouth once daily., Disp: , Rfl:     clonazePAM (KlonoPIN) 0.5 mg tablet, Take 1 tablet (0.5 mg) by mouth once daily as needed for anxiety., Disp: , Rfl:     cyanocobalamin (Vitamin B-12) 1,000 mcg tablet, , Disp: , Rfl:     ergocalciferol (Vitamin D-2) 1.25 MG (66757 UT) capsule, Take 1 capsule (1,250 mcg) by mouth 1 (one) time per week. X 8 weeks. After completion use OTC vitamin D3 2000iu daily therafter, Disp: , Rfl:     FIBER, PSYLLIUM HUSK, ORAL, Take by mouth., Disp: , Rfl:     hydrOXYzine HCL  (Atarax) 25 mg tablet, Take 1 tablet (25 mg) by mouth 3 times a day as needed for anxiety., Disp: 90 tablet, Rfl: 0    hydrOXYzine pamoate (VistariL) 25 mg capsule, Take 1 capsule (25 mg) by mouth as needed at bedtime for anxiety., Disp: 30 capsule, Rfl: 2    venlafaxine (Effexor) 37.5 mg tablet, Take 1 tablet (37.5 mg) by mouth once daily., Disp: 90 tablet, Rfl: 0    venlafaxine XR (Effexor-XR) 150 mg 24 hr capsule, Take 1 capsule (150 mg) by mouth once daily. Do not crush or chew., Disp: 90 capsule, Rfl: 0    Medical History:  No past medical history on file.    Past Psychiatric History:   Onset History: Anxiety and depression 10 yo.   Inpatient history: None   Suicide attempts/Self-Harm/Ideation History: None   Current providers: Dr Brink.   Past providers: Dr Chavez, Dr Parham.   Past medication trials: fluoxetine, Cymbalta, BuSpar, venlafaxine ER, clonazepam.     Family psychiatric history:  Mother    · Family history of depression  Maternal Grandfather    · Family history of alcoholism    · Family history of depression    Social History:   Upbringing: Born and raised in [place], [place]. Parents , father 21 years older than his mother. When he was 10 yo all his grandparents passed away within a few years, impacted him greatly, felt he was not safe, created fear that his something would happen to his parents. Had a great, loving family growing up. One younger sister.   Trauma: Bullied as a pre-teen for being overweight, developed negative thoughts and severe social anxiety. A lot of it went away as he became and adult and matured. He would be terrified that people were always talking about him and making fun of him. Denies hx of physical and sexual abuse.  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    MEDICAL HISTORY  -PCP: Jordin Hale MD    -TBI/head trauma/LOC/seizure hx: denies      Objective   Mental Status Exam  Appearance: Jordin has short dark hair with a full beard. He is neat and clean in appearance, dressed appropriately.   Attitude: Calm, cooperative, and engaged in conversation.  Behavior: Appropriate eye contact.   Motor Activity: No psychomotor agitation or retardation. No abnormal movements, tremors or tics. No evidence of extrapyramidal symptoms or tardive dyskinesia.  Speech: Regular rate, rhythm, volume. Spontaneous, no pressured speech.  Mood: \"good\"  Affect: Full range, appropriate, mood congruent.  Thought Process: Linear, logical, and goal-directed. No loose associations or gross thought disorganization.  Thought Content: Denied current suicidal ideation or thoughts of harm to self, denied homicidal ideation or thoughts of harm to others. No delusional thinking elicited. No perseverations or obsessions identified.   Perception: Did not endorse auditory or visual hallucinations, did not appear to be responding to hallucinatory stimuli.   Cognition: Alert, oriented x3. Preserved attention span and concentration, recent and remote memory. Adequate fund of knowledge. No deficits in language.   Insight: Good, in regards to understanding mental health condition  Judgement: Intact      Vitals:  There were no vitals filed for this visit.    Risk Assessment:  SI/HI ASSESSMENT  -Risk Assessment: Jordin Middleton is currently a low acute risk of suicide and self-harm due to no past suicide " attempt(s) and not currently endorsing thoughts of suicide. Jordin Middleton is currently a low acute risk of violence and harm to others due to no past history of violence and not currently threatening others.  -Suicidal Risk Factors:  and male  -Violence Risk Factors: male  -Protective Factors: strong coping skills, sense of responsibility towards family, social support/connectedness, child related concerns/living with child <18 years, positive family relationships, hopefulness/future orientation, marriage/partnership, and employment  -Plan to Reduce Risk: Establish medication regimen and outpatient follow-up care    Diagnoses and all orders for this visit:  Dysthymic disorder  Anxiety  Panic disorder  Major depressive disorder, recurrent episode, moderate with anxious distress (Multi)       Impression:  Jordin is doing well, continues to feel he is in a good spot mentally.  Jordin will still have anxious and depressive thoughts but he is able to manage them well.  Continues to work with Dr Brink regularly.  He has started taking melatonin and magnesium at bedtime which has helped with sleep, and is giving him better quality sleep.  Discussed treatment plan, will continue current medication regimen and plan to follow-up in 3 months.    Plan/Recommendations:  Medications:    -Continue venlafaxine ER one 150 mg cap and one 37.5 mg cap daily for anxiety and depression   -Continue hydroxyzine 25 mg one at bedtime as needed for anxiety and sleep  Continue therapy sessions as needed  Follow up: Feb 19th 2:00  Call  Psychiatry at [phone] with issues.  For [facility] residents, [facility] is a 24/7 hotline you can call for assistance [317.890.1739]. Please call [phone] or go to your closest Emergency Room if you feel unsafe. This includes thoughts of hurting yourself or anyone else, or having other troubles such as hearing voices, seeing visions, or having new and scary thoughts about the people  around you.    Review with patient: Treatment plan reviewed with the patient.  Medication risks/benefit reviewed with the patient    Time Spent:  Prep time: 1 min  Direct patient time: 18 min  Documentation time: 6 min  Total time: 26 min    ROBERT Salinas-CNP

## 2024-12-04 ENCOUNTER — APPOINTMENT (OUTPATIENT)
Dept: BEHAVIORAL HEALTH | Facility: CLINIC | Age: 39
End: 2024-12-04
Payer: MEDICAID

## 2024-12-04 DIAGNOSIS — F41.9 ANXIETY: ICD-10-CM

## 2024-12-04 PROCEDURE — 90837 PSYTX W PT 60 MINUTES: CPT | Performed by: PSYCHOLOGIST

## 2024-12-04 NOTE — PROGRESS NOTES
Insight Oriented Therapy. 50 min.  Anxiety.  We spoke about how he is trying to continue finding balance in his work where there is a lot going on and how to optimize his priorities while continuing to focus on family.    Chief complaint - Phase of life and emotional distress   Treatment plan - Insight and action consistent with ACT therapy  Providing support, safe space to process their thoughts and feelings, developing therapeutic relationship.  Goals - Self-care, insight, coping skills  Prognosis - Average  Progress - Average  Functional status - 70/100  Focused mental status: Patient was oriented to person, place, time and context  Focused mental exam - alert and oriented  Frequency - Every two weeks

## 2025-02-17 ENCOUNTER — APPOINTMENT (OUTPATIENT)
Dept: BEHAVIORAL HEALTH | Facility: CLINIC | Age: 40
End: 2025-02-17
Payer: MEDICAID

## 2025-02-17 DIAGNOSIS — F41.9 ANXIETY: ICD-10-CM

## 2025-02-17 PROCEDURE — 90837 PSYTX W PT 60 MINUTES: CPT | Performed by: PSYCHOLOGIST

## 2025-02-17 NOTE — PROGRESS NOTES
Humanistic/Insight Oriented Therapy. 50 min.  Anxiety. Great session talking about momentum, how he can start with himself outward, saving time for him after leaving so much out there with work and family.      Chief complaint - Phase of life and emotional distress   Treatment plan - Insight and action consistent with ACT therapy. Providing support, safe space to process their thoughts and feelings, developing therapeutic relationship.  Goals - Self-care, insight, coping skills  Prognosis - Average  Progress - Average  Functional status - 70/100  Focused mental status: Patient was oriented to person, place, time and context  Focused mental exam - alert and oriented  Frequency - Every two weeks     An interactive audio and video telecommunication system which permits real time communications between the patient (at the originating site) and provider (at the distant site) was utilized to provide this telehealth service

## 2025-02-19 ENCOUNTER — APPOINTMENT (OUTPATIENT)
Dept: BEHAVIORAL HEALTH | Facility: CLINIC | Age: 40
End: 2025-02-19
Payer: MEDICAID

## 2025-02-20 ENCOUNTER — APPOINTMENT (OUTPATIENT)
Dept: BEHAVIORAL HEALTH | Facility: CLINIC | Age: 40
End: 2025-02-20
Payer: MEDICAID

## 2025-02-20 DIAGNOSIS — F33.1 MAJOR DEPRESSIVE DISORDER, RECURRENT EPISODE, MODERATE WITH ANXIOUS DISTRESS (MULTI): ICD-10-CM

## 2025-02-20 DIAGNOSIS — F34.1 DYSTHYMIC DISORDER: ICD-10-CM

## 2025-02-20 DIAGNOSIS — F41.0 PANIC DISORDER: ICD-10-CM

## 2025-02-20 DIAGNOSIS — F41.9 ANXIETY: ICD-10-CM

## 2025-02-20 PROCEDURE — 99213 OFFICE O/P EST LOW 20 MIN: CPT

## 2025-02-20 RX ORDER — HYDROXYZINE HYDROCHLORIDE 25 MG/1
25 TABLET, FILM COATED ORAL 3 TIMES DAILY PRN
Qty: 90 TABLET | Refills: 0 | Status: SHIPPED | OUTPATIENT
Start: 2025-02-20

## 2025-02-20 RX ORDER — VENLAFAXINE 37.5 MG/1
37.5 TABLET ORAL DAILY
Qty: 90 TABLET | Refills: 0 | Status: SHIPPED | OUTPATIENT
Start: 2025-02-20

## 2025-02-20 RX ORDER — VENLAFAXINE HYDROCHLORIDE 150 MG/1
150 CAPSULE, EXTENDED RELEASE ORAL DAILY
Qty: 90 CAPSULE | Refills: 0 | Status: SHIPPED | OUTPATIENT
Start: 2025-02-20

## 2025-02-20 NOTE — PROGRESS NOTES
Outpatient Psychiatry- Follow up visit    Subjective   Jordin Middleton, a 40 y.o. male, presenting for follow up visit for   Chief Complaint   Patient presents with    MDD (Major Depressive Disorder)    Anxiety    Panic Attack      Virtual or Telephone Consent    An interactive audio and video telecommunication system which permits real time communications between the patient (at the originating site) and provider (at the distant site) was utilized to provide this telehealth service.   Verbal consent was requested and obtained from Jordin Middleton on this date, 02/22/25 for a telehealth visit.      HPI:  Jordin states things have been really good since last visit, mood is good. Has work stress but overall doing well. Nothing that is putting him in a rut.     Still eating healthy and exercising which helps with with his mental health.     KURT is doing another round of chemo; have been unable to see her due to his kids being ill so much. Has been stressful on everyone.     Anxiety is manageable; has not had any panic attacks recently. Will try to change his mindset so he does not get to that point.     Sleep is hit and miss; will have some difficulty falling and staying asleep. Tends to have an increase in anxiety at night. Keeping up with a consistent nighttime routine. Will listen to a meditation donovan to help and will take hydroxyzine and magnesium nightly.     Per previous HPI:  Jordin states things have been pretty good over the last few months. Has had some moments here and there with anxiety but is manageable.     KURT is battling lung cancer and that has been hard on them. Not sure how she is doing because she keeps it to herself. Has lost her will to fight which is hard on his wife.     Mood is good, denies lingering depressive sx. Has the same depressive and anxiety thoughts that come and go but not lasting long. Pretty confident where he is currently. Denies SI/HI and passive thoughts of death.     Sleep has been OK;  starting taking magnesium and melatonin at night which has helped a lot with is sleep. Not always getting a lot of sleep due to the children but is getting better quality of sleep.     Plans to go to [place] in December with the kids to see his uncle.    Psychiatric Review Of Systems:  Depressive Symptoms: negative  Manic Symptoms: negative  Anxiety Symptoms: General Anxiety Disorder (ELYSSA)ELYSSA Behaviors: manageable  Psychotic Symptoms: negative  Trauma Symptoms: Avoiding triggers  Other Symptoms/Concerns:Other: (comments) none reported  Delirium/Altered Mental Status Symptoms:Other: (comment) WNL    Current Medications:    Current Outpatient Medications:     ammonium lactate (Lac-Hydrin) 12 % lotion, Apply topically 2 times a day. As directed, Disp: , Rfl:     cetirizine (ZyrTEC) 10 mg capsule, Take 1 capsule (10 mg) by mouth once daily., Disp: , Rfl:     clonazePAM (KlonoPIN) 0.5 mg tablet, Take 1 tablet (0.5 mg) by mouth once daily as needed for anxiety., Disp: , Rfl:     cyanocobalamin (Vitamin B-12) 1,000 mcg tablet, , Disp: , Rfl:     ergocalciferol (Vitamin D-2) 1.25 MG (73305 UT) capsule, Take 1 capsule (1,250 mcg) by mouth 1 (one) time per week. X 8 weeks. After completion use OTC vitamin D3 2000iu daily therafter, Disp: , Rfl:     FIBER, PSYLLIUM HUSK, ORAL, Take by mouth., Disp: , Rfl:     hydrOXYzine HCL (Atarax) 25 mg tablet, Take 1 tablet (25 mg) by mouth 3 times a day as needed for anxiety., Disp: 90 tablet, Rfl: 0    venlafaxine (Effexor) 37.5 mg tablet, Take 1 tablet (37.5 mg) by mouth once daily., Disp: 90 tablet, Rfl: 0    venlafaxine XR (Effexor-XR) 150 mg 24 hr capsule, Take 1 capsule (150 mg) by mouth once daily. Do not crush or chew., Disp: 90 capsule, Rfl: 0    Medical History:  History reviewed. No pertinent past medical history.    Past Psychiatric History:   Onset History: Anxiety and depression 10 yo.   Inpatient history: None   Suicide attempts/Self-Harm/Ideation History: None   Current  providers: Dr Brink.   Past providers: Dr Chavez, Dr Parham.   Past medication trials: fluoxetine, Cymbalta, BuSpar, venlafaxine ER, clonazepam.     Family psychiatric history:  Mother    · Family history of depression  Maternal Grandfather    · Family history of alcoholism    · Family history of depression    Social History:   Upbringing: Born and raised in [place], [place]. Parents , father 21 years older than his mother. When he was 12 yo all his grandparents passed away within a few years, impacted him greatly, felt he was not safe, created fear that something would happen to his parents. Had a great, loving family growing up. One younger sister.   Trauma: Bullied as a pre-teen for being overweight, developed negative thoughts and severe social anxiety. A lot of it went away as he became and adult and matured. He would be terrified that people were always talking about him and making fun of him. Denies hx of physical and sexual abuse.  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    MEDICAL HISTORY  -PCP: Jordin Hale MD    -TBI/head trauma/LOC/seizure hx: denies      Objective   Mental Status Exam  Appearance: Jordin has short dark hair with a full beard. He is neat and clean in appearance, dressed appropriately.   Attitude: Calm, cooperative, and engaged in conversation.  Behavior: Appropriate  "eye contact.   Motor Activity: No psychomotor agitation or retardation. No abnormal movements, tremors or tics. No evidence of extrapyramidal symptoms or tardive dyskinesia.  Speech: Regular rate, rhythm, volume. Spontaneous, no pressured speech.  Mood: \"good\"  Affect: Full range, appropriate, mood congruent.  Thought Process: Linear, logical, and goal-directed. No loose associations or gross thought disorganization.  Thought Content: Denied current suicidal ideation or thoughts of harm to self, denied homicidal ideation or thoughts of harm to others. No delusional thinking elicited. No perseverations or obsessions identified.   Perception: Did not endorse auditory or visual hallucinations, did not appear to be responding to hallucinatory stimuli.   Cognition: Alert, oriented x3. Preserved attention span and concentration, recent and remote memory. Adequate fund of knowledge. No deficits in language.   Insight: Good, in regards to understanding mental health condition  Judgement: Intact      Vitals:  There were no vitals filed for this visit.    Risk Assessment:  SI/HI ASSESSMENT  -Risk Assessment: Jordin Middleton is currently a low acute risk of suicide and self-harm due to no past suicide attempt(s) and not currently endorsing thoughts of suicide. Jordin Middleton is currently a low acute risk of violence and harm to others due to no past history of violence and not currently threatening others.  -Suicidal Risk Factors:  and male  -Violence Risk Factors: male  -Protective Factors: strong coping skills, sense of responsibility towards family, social support/connectedness, child related concerns/living with child <18 years, positive family relationships, hopefulness/future orientation, marriage/partnership, and employment  -Plan to Reduce Risk: Establish medication regimen and outpatient follow-up care    Jordin was seen today for mdd (major depressive disorder), anxiety and panic attack.  Diagnoses and all orders " for this visit:  Major depressive disorder, recurrent episode, moderate with anxious distress (Multi)  -     Follow Up In Psychiatry; Future  Anxiety  -     Follow Up In Psychiatry  -     venlafaxine XR (Effexor-XR) 150 mg 24 hr capsule; Take 1 capsule (150 mg) by mouth once daily. Do not crush or chew.  -     venlafaxine (Effexor) 37.5 mg tablet; Take 1 tablet (37.5 mg) by mouth once daily.  -     hydrOXYzine HCL (Atarax) 25 mg tablet; Take 1 tablet (25 mg) by mouth 3 times a day as needed for anxiety.  -     Follow Up In Psychiatry; Future  Panic disorder  -     Follow Up In Psychiatry  Dysthymic disorder  -     Follow Up In Psychiatry    Impression:  Jordin continues to do well; mood is good and he has been able to manage his anxiety.  Will use coping skills as needed.  Continues to work with Dr Brink regularly.  Jordin is sleeping well with the use of hydroxyzine, magnesium and melatonin as needed.  Discussed treatment plan, will continue current medication regimen and plan to follow-up in 3 months.    Plan/Recommendations:  Medications:    -Continue venlafaxine ER one 150 mg cap and one 37.5 mg cap daily for anxiety and depression   -Continue hydroxyzine 25 mg one at bedtime as needed for anxiety and sleep  Continue therapy sessions as needed  Follow up: May 22nd 0930  Call  Psychiatry at [phone] with issues.  For [facility] residents, [employer] is a 24/7 hotline you can call for assistance [[phone]]. Please call [phone] or go to your closest Emergency Room if you feel unsafe. This includes thoughts of hurting yourself or anyone else, or having other troubles such as hearing voices, seeing visions, or having new and scary thoughts about the people around you.    Review with patient: Treatment plan reviewed with the patient.    Time Spent:  Prep time: 1 min  Direct patient time: 20 min  Documentation time: 6 min  Total time: 27 min    ROBERT Salinas-CNP

## 2025-02-23 NOTE — PATIENT INSTRUCTIONS
Plan/Recommendations:  Medications:    -Continue venlafaxine ER one 150 mg cap and one 37.5 mg cap daily for anxiety and depression   -Continue hydroxyzine 25 mg one at bedtime as needed for anxiety and sleep  Continue therapy sessions as needed  Follow up: May 22nd 0930  Call  Psychiatry at [phone] with issues.  For [facility] residents, [employer] is a 24/7 hotline you can call for assistance [[phone]]. Please call [phone] or go to your closest Emergency Room if you feel unsafe. This includes thoughts of hurting yourself or anyone else, or having other troubles such as hearing voices, seeing visions, or having new and scary thoughts about the people around you.

## 2025-04-02 ENCOUNTER — APPOINTMENT (OUTPATIENT)
Dept: BEHAVIORAL HEALTH | Facility: CLINIC | Age: 40
End: 2025-04-02
Payer: MEDICAID

## 2025-04-02 DIAGNOSIS — F41.9 ANXIETY: ICD-10-CM

## 2025-04-02 PROCEDURE — 90832 PSYTX W PT 30 MINUTES: CPT | Performed by: PSYCHOLOGIST

## 2025-04-02 NOTE — PROGRESS NOTES
Humanistic/Insight Oriented Therapy. 30 min.  Anxiety.  Half session due to weather.  We spoke about his biomarkers and how this has propelled him into high motivation for meaningful physical change.     Chief complaint - Phase of life and emotional distress   Treatment plan - Insight and action consistent with ACT therapy. Providing support, safe space to process their thoughts and feelings, developing therapeutic relationship.  Goals - Self-care, insight, coping skills  Prognosis - Average  Progress - Average  Functional status - 70/100  Focused mental status: Patient was oriented to person, place, time and context  Focused mental exam - alert and oriented  Frequency - Every two weeks     An interactive audio and video telecommunication system which permits real time communications between the patient (at the originating site) and provider (at the distant site) was utilized to provide this telehealth service

## 2025-05-22 ENCOUNTER — APPOINTMENT (OUTPATIENT)
Dept: BEHAVIORAL HEALTH | Facility: CLINIC | Age: 40
End: 2025-05-22
Payer: MEDICAID

## 2025-05-22 DIAGNOSIS — F33.1 MAJOR DEPRESSIVE DISORDER, RECURRENT EPISODE, MODERATE WITH ANXIOUS DISTRESS (MULTI): ICD-10-CM

## 2025-05-22 DIAGNOSIS — F41.0 PANIC DISORDER: ICD-10-CM

## 2025-05-22 DIAGNOSIS — F41.9 ANXIETY: ICD-10-CM

## 2025-05-22 PROCEDURE — 1036F TOBACCO NON-USER: CPT

## 2025-05-22 PROCEDURE — 99214 OFFICE O/P EST MOD 30 MIN: CPT

## 2025-05-22 RX ORDER — VENLAFAXINE HYDROCHLORIDE 150 MG/1
150 CAPSULE, EXTENDED RELEASE ORAL DAILY
Qty: 90 CAPSULE | Refills: 0 | Status: SHIPPED | OUTPATIENT
Start: 2025-05-22

## 2025-05-22 RX ORDER — HYDROXYZINE HYDROCHLORIDE 25 MG/1
25 TABLET, FILM COATED ORAL 3 TIMES DAILY PRN
Qty: 90 TABLET | Refills: 0 | Status: SHIPPED | OUTPATIENT
Start: 2025-05-22

## 2025-05-22 RX ORDER — VENLAFAXINE 37.5 MG/1
37.5 TABLET ORAL DAILY
Qty: 90 TABLET | Refills: 0 | Status: SHIPPED | OUTPATIENT
Start: 2025-05-22

## 2025-05-22 NOTE — PROGRESS NOTES
Outpatient Psychiatry- Follow up visit    Subjective   Jordin Middleton, a 40 y.o. male, presenting for follow up visit for   Chief Complaint   Patient presents with    Anxiety    MDD (Major Depressive Disorder)      Virtual or Telephone Consent    An interactive audio and video telecommunication system which permits real time communications between the patient (at the originating site) and provider (at the distant site) was utilized to provide this telehealth service.   Verbal consent was requested and obtained from Jordin Middleton on this date, 05/22/25 for a telehealth visit.      HPI:  Jordin states things have been pretty good but dealing with some injuries. His doctor thinks he has sciatic nerve pain, frustrating because he cannot do his normal workout routine. In PT currently, mildly helpful so far.     Mood has been OK, just frustrated. Is a little more positive now because he has seen a doctor and knows what it is and how to manage it.  Denies SI/HI and passive thoughts of death.     Anxiety has been elevated but denies panic attacks. Ran out of hydroxyzine.    NOTE: Symptom scale is rated where 0 = no symptoms at all, and 10 = symptoms so severe that pt is an imminent danger to themselves or others and requires hospitalization.    Anxiety remains present more days than not, which has worsened  over the past few weeks. Rates the severity of psych symptoms as a 5/10, ranges between 3-6, noting symptom improvement with exercise listening to meditation apps and practicing gratefulness and worsening of symptoms with injury and financial stress.    The last year he has been reinvesting in himself and his business causing some financial stress.     Sleep has not been great; pain had been disrupting sleep. Getting up early to do his exercise routine; will wake up early due to pain. Getting 5-6 hrs of sleep a night.     Per previous HPI:  Jordin states things have been really good since last visit, mood is good. Has work  stress but overall doing well. Nothing that is putting him in a rut.     Still eating healthy and exercising which helps with with his mental health.     KURT is doing another round of chemo; have been unable to see her due to his kids being ill so much. Has been stressful on everyone.     Anxiety is manageable; has not had any panic attacks recently. Will try to change his mindset so he does not get to that point.     Sleep is hit and miss; will have some difficulty falling and staying asleep. Tends to have an increase in anxiety at night. Keeping up with a consistent nighttime routine. Will listen to a meditation donovan to help and will take hydroxyzine and magnesium nightly.     Psychiatric Review Of Systems:  Depressive Symptoms: frustration with situational stressors  Manic Symptoms: negative  Anxiety Symptoms: General Anxiety Disorder (ELYSSA)ELYSSA Behaviors: difficult to control worry and excessive anxiety/worry  Psychotic Symptoms: negative  Trauma Symptoms: Avoiding triggers  Other Symptoms/Concerns:Other: (comments) none reported  Delirium/Altered Mental Status Symptoms:Other: (comment) WNL    Current Medications:    Current Outpatient Medications:     ammonium lactate (Lac-Hydrin) 12 % lotion, Apply topically 2 times a day. As directed, Disp: , Rfl:     cetirizine (ZyrTEC) 10 mg capsule, Take 1 capsule (10 mg) by mouth once daily., Disp: , Rfl:     clonazePAM (KlonoPIN) 0.5 mg tablet, Take 1 tablet (0.5 mg) by mouth once daily as needed for anxiety., Disp: , Rfl:     cyanocobalamin (Vitamin B-12) 1,000 mcg tablet, , Disp: , Rfl:     ergocalciferol (Vitamin D-2) 1.25 MG (61728 UT) capsule, Take 1 capsule (1,250 mcg) by mouth 1 (one) time per week. X 8 weeks. After completion use OTC vitamin D3 2000iu daily therafter, Disp: , Rfl:     FIBER, PSYLLIUM HUSK, ORAL, Take by mouth., Disp: , Rfl:     hydrOXYzine HCL (Atarax) 25 mg tablet, Take 1 tablet (25 mg) by mouth 3 times a day as needed for anxiety., Disp: 90  tablet, Rfl: 0    venlafaxine (Effexor) 37.5 mg tablet, Take 1 tablet (37.5 mg) by mouth once daily., Disp: 90 tablet, Rfl: 0    venlafaxine XR (Effexor-XR) 150 mg 24 hr capsule, Take 1 capsule (150 mg) by mouth once daily. Do not crush or chew., Disp: 90 capsule, Rfl: 0    Medical History:  History reviewed. No pertinent past medical history.    Past Psychiatric History:   Onset History: Anxiety and depression 10 yo.   Inpatient history: None   Suicide attempts/Self-Harm/Ideation History: None   Current providers: Dr Brink.   Past providers: Dr Chavez, Dr Parham.   Past medication trials: fluoxetine, Cymbalta, BuSpar, venlafaxine ER, clonazepam.     Family psychiatric history:  Mother    · Family history of depression  Maternal Grandfather    · Family history of alcoholism    · Family history of depression    Social History:   Upbringing: Born and raised in [place], [place]. Parents , father 21 years older than his mother. When he was 10 yo all his grandparents passed away within a few years, impacted him greatly, felt he was not safe, created fear that something would happen to his parents. Had a great, loving family growing up. One younger sister.   Trauma: Bullied as a pre-teen for being overweight, developed negative thoughts and severe social anxiety. A lot of it went away as he became and adult and matured. He would be terrified that people were always talking about him and making fun of him. Denies hx of physical and sexual abuse.  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  Marital Status:   Children: 2 boys, Jordy 2.6 yo, Timothy 1.6 yo  Living situation: Lives with wife and two boys  : Denies  Legal: Denies  Access to Weapons:  denies  Guardian/POA/Payee:  self    Substance Use History:  Tobacco use: denies  Use of alcohol: occasional, social use;2-3 drinks 2-3 times a month  Use of caffeine: coffee 3 /day  Use of other substances:  "denies  Legal consequences of substance use: denies  Substance use disorder treatment: denies    Record Review: brief     Medical Review Of Systems:  A comprehensive review of systems was negative except for: Neurological: positive for Symptoms; Neuro: sciatic nerve pain    MEDICAL HISTORY  -PCP: Jordin Hale MD    -TBI/head trauma/LOC/seizure hx: denies      Objective   Mental Status Exam  Appearance: Jordin has short dark hair with a full beard. He is neat and clean in appearance, dressed appropriately.   Attitude: Calm, cooperative, and engaged in conversation.  Behavior: Appropriate eye contact.   Motor Activity: No psychomotor agitation or retardation. No abnormal movements, tremors or tics. No evidence of extrapyramidal symptoms or tardive dyskinesia.  Speech: Regular rate, rhythm, volume. Spontaneous, no pressured speech.  Mood: \"frustrated\"  Affect: Full range, appropriate, mood congruent.  Thought Process: Linear, logical, and goal-directed. No loose associations or gross thought disorganization.  Thought Content: Denied current suicidal ideation or thoughts of harm to self, denied homicidal ideation or thoughts of harm to others. No delusional thinking elicited. No perseverations or obsessions identified.   Perception: Did not endorse auditory or visual hallucinations, did not appear to be responding to hallucinatory stimuli.   Cognition: Alert, oriented x3. Preserved attention span and concentration, recent and remote memory. Adequate fund of knowledge. No deficits in language.   Insight: Good, in regards to understanding mental health condition  Judgement: Intact      Vitals:  There were no vitals filed for this visit.    Risk Assessment:  SI/HI ASSESSMENT  -Risk Assessment: Jordin Middleton is currently a low acute risk of suicide and self-harm due to no past suicide attempt(s) and not currently endorsing thoughts of suicide. Jordin Middleton is currently a low acute risk of violence and harm to others due to " no past history of violence and not currently threatening others.  -Suicidal Risk Factors:  and male  -Violence Risk Factors: male  -Protective Factors: strong coping skills, sense of responsibility towards family, social support/connectedness, child related concerns/living with child <18 years, positive family relationships, hopefulness/future orientation, marriage/partnership, and employment  -Plan to Reduce Risk: Establish medication regimen and outpatient follow-up care    Jordin was seen today for anxiety and mdd (major depressive disorder).  Diagnoses and all orders for this visit:  Major depressive disorder, recurrent episode, moderate with anxious distress (Multi)  -     Follow Up In Psychiatry  Anxiety  -     Follow Up In Psychiatry  -     hydrOXYzine HCL (Atarax) 25 mg tablet; Take 1 tablet (25 mg) by mouth 3 times a day as needed for anxiety.  -     venlafaxine XR (Effexor-XR) 150 mg 24 hr capsule; Take 1 capsule (150 mg) by mouth once daily. Do not crush or chew.  -     venlafaxine (Effexor) 37.5 mg tablet; Take 1 tablet (37.5 mg) by mouth once daily.  Panic disorder      Impression:  Jordin is dealing with sciatic nerve pain which is causing a lot of frustration because he is not able to do his normal workout routine.  The pain is also affecting his sleep.  Anxiety is increased over circumstantial stressors; Jordin ran out of hydroxyzine a week ago.  Mood is stable overall.  Discussed treatment plan, will continue current medication regimen and plan to follow-up in 3 months.  Jordin remains in therapy regularly.    Plan/Recommendations:  Medications:    -Continue venlafaxine ER one 150 mg cap and one 37.5 mg cap daily for anxiety and depression   -Continue hydroxyzine 25 mg one three times daily as needed for anxiety and sleep  Continue therapy sessions as needed  Follow up: Aug 28th 1030  Call  Psychiatry at [phone] with issues.  For [facility] residents, [employer] is a 24/7 hotline  you can call for assistance [954.728.5126]. Please call [phone] or go to your closest Emergency Room if you feel unsafe. This includes thoughts of hurting yourself or anyone else, or having other troubles such as hearing voices, seeing visions, or having new and scary thoughts about the people around you.    Review with patient: Treatment plan reviewed with the patient.    Time Spent:  Prep time: 2 min  Direct patient time: 7 min via video, finished with phon call due to technical issues. Total time  with pt 23 min  Documentation time: 4 min  Total time: 29 min    Irina Friedman, APRN-CNP

## 2025-05-22 NOTE — PATIENT INSTRUCTIONS
Plan/Recommendations:  Medications:    -Continue venlafaxine ER one 150 mg cap and one 37.5 mg cap daily for anxiety and depression   -Continue hydroxyzine 25 mg one three times daily as needed for anxiety and sleep  Continue therapy sessions as needed  Follow up: Aug 28th 1030  Call  Psychiatry at [phone] with issues.  For [facility] residents, [employer] is a 24/7 hotline you can call for assistance [450.288.6047]. Please call [phone] or go to your closest Emergency Room if you feel unsafe. This includes thoughts of hurting yourself or anyone else, or having other troubles such as hearing voices, seeing visions, or having new and scary thoughts about the people around you.

## 2025-06-12 ENCOUNTER — APPOINTMENT (OUTPATIENT)
Dept: ORTHOPEDIC SURGERY | Facility: CLINIC | Age: 40
End: 2025-06-12
Payer: MEDICAID

## 2025-06-12 DIAGNOSIS — S76.312S HAMSTRING MUSCLE STRAIN, LEFT, SEQUELA: ICD-10-CM

## 2025-06-12 DIAGNOSIS — M76.899 HAMSTRING TENDONITIS: Primary | ICD-10-CM

## 2025-06-12 DIAGNOSIS — R10.32 GROIN PAIN, LEFT: ICD-10-CM

## 2025-06-12 PROCEDURE — 99203 OFFICE O/P NEW LOW 30 MIN: CPT | Performed by: PHYSICAL MEDICINE & REHABILITATION

## 2025-06-12 SDOH — SOCIAL STABILITY: SOCIAL NETWORK: SOCIAL ACTIVITY:: 5

## 2025-06-12 NOTE — PROGRESS NOTES
"PM&R  / Ortho clinic eval:    IMPRESSION:  Patient is a very pleasant 40 year old male presenting with left hamstring and left groin pain. Chronic hamstring/ groin combination strain.   -Adductor strain and   -Hamstring tendonitis vs hamstring muscle strain    Could be referred pain from hip labral tear or early hip arthritis but much less likely    RECOMMENDATIONS:  - Continue physical therapy; after basics do return to sports / running protocol. New referral placed for sport-specific therapist  - Discussed muscle activation; strengthening adductors and discussed functional positions.  - Discussed massage to the area as well, possible medical massage in the future   - MRI L Hip if patient has not improved after physical therapy   - Discussed possible future interventions including cortisone, prolotherapy, PRP with Sports Medicine   f/u 2 or so months out     Monica Milton MD  Fellow [facility], PM&R    Diagnoses and plan discussed with the patient, patient educated on above diagnoses and treatments, including alternatives       Supervisory note:  Seen with Dr Monica Milton M.D.   fellow.  I saw and evaluated the patient. I personally obtained the key and critical portions of the history and physical exam. I reviewed the fellow's documentation and discussed the patient with the fellow. I agree with the fellow's medical decision making as documented in the note.     I made additions/changes above.  I'd have low threshold for hip mri then ?referral for PRP if not improving, but I think he can still rehab this as a strain.     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 " "  Patient felt he pulled his hamstring on the left side while running ~2mo ago. The pain radiated from left inner thigh up into his buttock. [facility] and was sent to physical therapy. While the pain has improved since the Fall it is still bothersome. He has stopped physical activity and is only walking. He would really want to return to running.     Patient states originally the pain was in his left hamstring. He reports it felt tight.   Patient stopped all activity, did rest, ice. Over time the pain became more severe in the upper groin and in the lower back. Patient states he went to PT and initially it did not improve. The pain currently is not in the groin as much, but the hamstring has not fully improved. His back pain \"comes and goes,\" but the pain is beneath the buttock and above the knee. Depending on the activity he is doing, it radiates into the buttock.     Patient states at its worst the pain was an 8/10 but more recently the pain has been 6/10 at worst and 5/10 on average. The pain is constant. Patient states when he is prone with his leg out, that exercise helps relieve the pain.     Patient denies any numbness or tingling in the legs. Patient's pain does not extend past the knee. Patient reports the pain feels like tightness.   Patient states at the beginning he was taking ibuprofen but it was minimally helping. He uses biofreeze. He reports some relief from movement. Patient denies much pain in the lower back. Patient has a history of playing basketball.     Patient started physical therapy in end of March/ Early April.  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        Patient reports no fevers, chills, sweats, night pain, weight loss or cancer history.    Pertinent Physical Exam:  MSK: Gait WNL   Lumbar Spine / Sacrum: Full ROM, quadrant tests/facet loading negative, SI joint maneuvers negative.  Str Leg " Raise neg, hip provocative maneuvers negative     Left hip/thigh: L hip ROM with pain at end knee extension, minimally tender over L hamstring, Not tender at L ischial tuberosity, active knee flexion is painful and  mildly weak; pain noted at palpation of left adductor muscle insertions (more medial)  Pain with resisted adduction in left adductor muscles with one leg lifted up when laying on left side  No achilles reflex at LLE      SUPPORTING DOCUMENTATION (remaining history, exam, other findings):  Work-up reviewed - this has included [facility]    Treatment has included physical therapy; OTC medications    See above for Assessment and Plan

## 2025-08-28 ENCOUNTER — APPOINTMENT (OUTPATIENT)
Dept: BEHAVIORAL HEALTH | Facility: CLINIC | Age: 40
End: 2025-08-28
Payer: MEDICAID

## 2025-08-28 DIAGNOSIS — F41.9 ANXIETY: ICD-10-CM

## 2025-08-28 DIAGNOSIS — F41.0 PANIC DISORDER: ICD-10-CM

## 2025-08-28 DIAGNOSIS — F33.1 MAJOR DEPRESSIVE DISORDER, RECURRENT EPISODE, MODERATE WITH ANXIOUS DISTRESS (MULTI): ICD-10-CM

## 2025-08-28 PROCEDURE — 99214 OFFICE O/P EST MOD 30 MIN: CPT

## 2025-08-28 RX ORDER — VENLAFAXINE 37.5 MG/1
37.5 TABLET ORAL DAILY
Qty: 90 TABLET | Refills: 0 | Status: SHIPPED | OUTPATIENT
Start: 2025-08-28

## 2025-08-28 RX ORDER — VENLAFAXINE HYDROCHLORIDE 150 MG/1
150 CAPSULE, EXTENDED RELEASE ORAL DAILY
Qty: 90 CAPSULE | Refills: 0 | Status: SHIPPED | OUTPATIENT
Start: 2025-08-28

## 2025-08-28 RX ORDER — HYDROXYZINE HYDROCHLORIDE 25 MG/1
25 TABLET, FILM COATED ORAL 3 TIMES DAILY PRN
Qty: 90 TABLET | Refills: 0 | Status: SHIPPED | OUTPATIENT
Start: 2025-08-28